# Patient Record
Sex: FEMALE | URBAN - METROPOLITAN AREA
[De-identification: names, ages, dates, MRNs, and addresses within clinical notes are randomized per-mention and may not be internally consistent; named-entity substitution may affect disease eponyms.]

---

## 2022-10-03 ENCOUNTER — TELEPHONE (OUTPATIENT)
Dept: GASTROENTEROLOGY | Facility: CLINIC | Age: 52
End: 2022-10-03

## 2022-10-03 NOTE — TELEPHONE ENCOUNTER
Telephone:  I had a telephone conversation with SRINIVASAN (Kenia Wilkins )  Gastroenterology from [facility].  With was about this patient Mari Hillman.  She carries a diagnosis of alcoholic liver disease.  She has a low meld score of around 17 according to the healthcare provider.  But she also has problems with mental status as patient has some alteration in her mental status.  She is on Dilaudid for pain.  They are not quite sure how long she has been off alcohol but might be since February 2022.    She has other comorbidities which include major depression, PTSD and recent incident of suicidal ideation.  She was supposed to be seen by hepatology as an outpatient.    Considering that number of patient is on the waiting list to come here and the fact that the patient is meld score is low and her at times off and on deciding to be on hospice we reiterated that can be managed locally and that she could be seen also as an outpatient.  They will keep us posted about her condition or if there is any change.  The plan is to see her as an outpatient first available in our clinic once discharged.    Please note that we do not have her care everywhere  to look the outside noticed from [place].    Tangela Ivory MD